# Patient Record
Sex: FEMALE | Race: BLACK OR AFRICAN AMERICAN | Employment: UNEMPLOYED | URBAN - METROPOLITAN AREA
[De-identification: names, ages, dates, MRNs, and addresses within clinical notes are randomized per-mention and may not be internally consistent; named-entity substitution may affect disease eponyms.]

---

## 2022-05-18 ENCOUNTER — TRANSCRIBE ORDER (OUTPATIENT)
Dept: SCHEDULING | Age: 62
End: 2022-05-18

## 2022-05-18 DIAGNOSIS — Z01.818 OTHER SPECIFIED PRE-OPERATIVE EXAMINATION: Primary | ICD-10-CM

## 2022-05-31 ENCOUNTER — HOSPITAL ENCOUNTER (OUTPATIENT)
Dept: CT IMAGING | Age: 62
Discharge: HOME | End: 2022-05-31
Attending: INTERNAL MEDICINE
Payer: MEDICAID

## 2022-05-31 DIAGNOSIS — Z01.818 OTHER SPECIFIED PRE-OPERATIVE EXAMINATION: ICD-10-CM

## 2022-05-31 LAB
BUN SERPL-MCNC: 21 MG/DL (ref 6–20)
CREAT SERPL-MCNC: 1.15 MG/DL (ref 0.55–1.02)

## 2022-05-31 PROCEDURE — 82565 ASSAY OF CREATININE: CPT

## 2022-05-31 PROCEDURE — 84520 ASSAY OF UREA NITROGEN: CPT

## 2022-05-31 PROCEDURE — 74011000636 HC RX REV CODE- 636

## 2022-05-31 PROCEDURE — 74011000250 HC RX REV CODE- 250

## 2022-05-31 PROCEDURE — 74177 CT ABD & PELVIS W/CONTRAST: CPT

## 2022-05-31 PROCEDURE — 36415 COLL VENOUS BLD VENIPUNCTURE: CPT

## 2022-05-31 RX ORDER — BARIUM SULFATE 20 MG/ML
450 SUSPENSION ORAL
Status: COMPLETED | OUTPATIENT
Start: 2022-05-31 | End: 2022-05-31

## 2022-05-31 RX ADMIN — IOPAMIDOL 100 ML: 612 INJECTION, SOLUTION INTRAVENOUS at 10:00

## 2022-05-31 RX ADMIN — BARIUM SULFATE 450 ML: 20 SUSPENSION ORAL at 08:30

## 2022-06-30 ENCOUNTER — HOSPITAL ENCOUNTER (EMERGENCY)
Age: 62
Discharge: HOME | End: 2022-06-30
Attending: FAMILY MEDICINE
Payer: MEDICAID

## 2022-06-30 ENCOUNTER — APPOINTMENT (OUTPATIENT)
Dept: CT IMAGING | Age: 62
End: 2022-06-30
Attending: FAMILY MEDICINE
Payer: MEDICAID

## 2022-06-30 ENCOUNTER — APPOINTMENT (OUTPATIENT)
Dept: GENERAL RADIOLOGY | Age: 62
End: 2022-06-30
Attending: FAMILY MEDICINE
Payer: MEDICAID

## 2022-06-30 VITALS
HEART RATE: 60 BPM | TEMPERATURE: 98.8 F | BODY MASS INDEX: 24.44 KG/M2 | RESPIRATION RATE: 16 BRPM | WEIGHT: 165 LBS | SYSTOLIC BLOOD PRESSURE: 118 MMHG | HEIGHT: 69 IN | OXYGEN SATURATION: 100 % | DIASTOLIC BLOOD PRESSURE: 65 MMHG

## 2022-06-30 DIAGNOSIS — M47.812 ARTHRITIS OF NECK: ICD-10-CM
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
## 2022-06-30 DIAGNOSIS — S00.83XA FACIAL CONTUSION, INITIAL ENCOUNTER: ICD-10-CM

## 2022-06-30 PROCEDURE — 72100 X-RAY EXAM L-S SPINE 2/3 VWS: CPT

## 2022-06-30 PROCEDURE — 70450 CT HEAD/BRAIN W/O DYE: CPT

## 2022-06-30 PROCEDURE — 99284 EMERGENCY DEPT VISIT MOD MDM: CPT

## 2022-06-30 PROCEDURE — 72125 CT NECK SPINE W/O DYE: CPT

## 2022-06-30 RX ORDER — MECLIZINE HYDROCHLORIDE 25 MG/1
25 TABLET ORAL
COMMUNITY

## 2022-06-30 RX ORDER — WARFARIN SODIUM 5 MG/1
5 TABLET ORAL DAILY
COMMUNITY

## 2022-06-30 RX ORDER — BUMETANIDE 0.5 MG/1
0.5 TABLET ORAL DAILY
COMMUNITY

## 2022-06-30 RX ORDER — PANTOPRAZOLE SODIUM 40 MG/1
40 TABLET, DELAYED RELEASE ORAL DAILY
COMMUNITY

## 2022-06-30 RX ORDER — METOPROLOL TARTRATE 25 MG/1
25 TABLET, FILM COATED ORAL 2 TIMES DAILY
COMMUNITY

## 2022-06-30 RX ORDER — EZETIMIBE 10 MG/1
10 TABLET ORAL DAILY
COMMUNITY

## 2022-06-30 RX ORDER — NITROGLYCERIN 0.3 MG/1
TABLET SUBLINGUAL
COMMUNITY

## 2022-06-30 RX ORDER — ESCITALOPRAM OXALATE 10 MG/1
10 TABLET ORAL DAILY
COMMUNITY

## 2022-06-30 RX ORDER — AMIODARONE HYDROCHLORIDE 200 MG/1
200 TABLET ORAL DAILY
COMMUNITY

## 2022-06-30 RX ORDER — ASPIRIN 81 MG/1
81 TABLET ORAL DAILY
COMMUNITY

## 2022-06-30 NOTE — ED NOTES
Checked on patient, patient resting comfortably with call bell and bed rails up. Will continue to monitor.

## 2022-06-30 NOTE — ED NOTES
Patient sleeping states her pain is minimal. Birth Control Pills Counseling: Birth Control Pill Counseling: I discussed with the patient the potential side effects of OCPs including but not limited to increased risk of stroke, heart attack, thrombophlebitis, deep venous thrombosis, hepatic adenomas, breast changes, GI upset, headaches, and depression.  The patient verbalized understanding of the proper use and possible adverse effects of OCPs. All of the patient's questions and concerns were addressed.

## 2022-06-30 NOTE — ED NOTES
[facility] here for patient report given to [facility] Par[facility]ic and Shira Olszewski EMT . Patient states she is dry and does not require a diaper change. Patient has her glasses and clothes and purse.

## 2022-06-30 NOTE — ED NOTES
Arranged transport to [place] via [facility] [place]).     ETA: 1000

## 2022-06-30 NOTE — ED TRIAGE NOTES
Pt arrived via EMS from [facility] with report of a fall with chin pain, right neck and right back pain. Nursing home paper states that CNA heard resident yelling and when CNA entered room pt was found on the floor sitting on her bottom. Pt told staff that she was trying to get out of bed and she got stuck between her bed and bed side table. Denies LOC. Pt received tylenol s/p fall at 0518. Pt arrived in C-collar and remains in Cervical collar.

## 2022-06-30 NOTE — ED PROVIDER NOTES
EMERGENCY DEPARTMENT HISTORY AND PHYSICAL EXAM          Date: 6/30/2022  Patient Name: Frank Marion    History of Presenting Illness     Chief Complaint   Patient presents with   Zina [place]field Fall    Neck Pain    Back Pain       History Provided By: Patient, EMS, Nursing home staff    HPI: Frank Marion is a 64 y.o. female, pmhx htn, dementia, frequent falls, who was brought  to the ED after she fell in her room, striking her chin on the bedside table. She denies LOC, but states that she began shouting for help soon after she fell. The CNA found her sitting on her bottom soon after that, and she helped her into her bed. EMS was called and they brought her to the ED with a cervical collar in place. Pt is able to move her arms normally and denies any numbness in her arms. She has some pain in the lateral right neck and also in her lower back, but more in the right lower lumbar region than in the spine. PCP: Ilda Lopez MD    Allergies: josselin  Lives at [facility]    There are no other complaints, changes, or physical findings at this time. Past History     Past Medical History:  Past Medical History:   Diagnosis Date    Aortic dissection (HCC)     Dementia ([facility] [address]     Falls frequently     High cholesterol     Hypertension        Past Surgical History:  History reviewed. No pertinent surgical history. Family History:  History reviewed. No pertinent family history. Social History:  Social History     Tobacco Use    Smoking status: Never Smoker    Smokeless tobacco: Never Used   Vaping Use    Vaping Use: Never used   Substance Use Topics    Alcohol use: Not Currently    Drug use: Never       Allergies:   Allergies   Allergen Reactions    Atorvastatin Unknown (comments)     \"all statins\"    Penicillins Rash    Percocet [Oxycodone-Acetaminophen] Itching         Review of Systems   Review of Systems   Unable to perform ROS: Dementia       Physical Exam   Physical Exam  Constitutional: Appearance: Normal appearance. HENT:      Head: Normocephalic. Right Ear: External ear normal.      Left Ear: External ear normal.      Nose: Nose normal.      Mouth/Throat:      Mouth: Mucous membranes are moist.   Eyes:      Pupils: Pupils are equal, round, and reactive to light. Neck:     Cardiovascular:      Rate and Rhythm: Normal rate and regular rhythm. Pulmonary:      Effort: Pulmonary effort is normal.      Breath sounds: Normal breath sounds. No wheezing or rhonchi. Abdominal:      General: Abdomen is flat. Palpations: Abdomen is soft. Tenderness: There is no abdominal tenderness. There is no guarding. Musculoskeletal:         General: No swelling, tenderness or signs of injury. Normal range of motion. Cervical back: No rigidity. Muscular tenderness present. Skin:     General: Skin is warm and dry. Neurological:      General: No focal deficit present. Mental Status: She is alert. Motor: No weakness. Comments: , biceps, triceps 5/5 bilaterally. Hip flexors, DF, PF 5/5 bilaterally. Sensation in tact to LT throughout, symmetric. Diagnostic Study Results     Labs -   No results found for this or any previous visit (from the past 12 hour(s)). Radiologic Studies -   XR SPINE LUMB 2 OR 3 V   Final Result   Mild DJD, no fracture or acute findings seen. CT HEAD WO CONT   Final Result   No acute findings seen. CT SPINE CERV WO CONT   Final Result   No acute findings. CT Results  (Last 48 hours)               06/30/22 0711  CT HEAD WO CONT Final result    Impression:  No acute findings seen. Narrative:  EXAM: CT HEAD WO CONT       INDICATION: fall, struck head on table       COMPARISON: None. CONTRAST: None. TECHNIQUE: Unenhanced CT of the head was performed using 5 mm images. Brain and   bone windows were generated. Coronal and sagittal reformats.  CT dose reduction   was achieved through use of a standardized protocol tailored for this   examination and automatic exposure control for dose modulation. FINDINGS:   There is no extra-axial fluid collection hemorrhage shift or masses. 06/30/22 0711  CT SPINE CERV WO CONT Final result    Impression:  No acute findings. Narrative:  EXAM:  CT CERVICAL SPINE WITHOUT CONTRAST       INDICATION: fall, struck head on table. COMPARISON: None. CONTRAST:  None. TECHNIQUE: Multislice helical CT of the cervical spine was performed without   intravenous contrast administration. Sagittal and coronal reformats were   generated. CT dose reduction was achieved through use of a standardized   protocol tailored for this examination and automatic exposure control for dose   modulation. FINDINGS:   Some reverse of the normal cervical lordosis. Calcification of the posterior   longitudinal ligament, disc space narrowing and anterior spurring. Mild   foraminal narrowing from C3 to C6 bilaterally. 3 C4 shows some mild uncovertebral joint degeneration hypertrophy resulting in   the mild foraminal stenosis. There is some central disc bulging. C4-C5 show mild bilateral foraminal narrowing. There is some central spurring   with mild canal narrowing. C5-C6 shows significant left foraminal narrowing by uncovertebral joint   hypertrophy, mild canal stenosis. C6-C7 shows no focal findings. No acute fracture or dislocation seen. CXR Results  (Last 48 hours)    None            Medical Decision Making   I am the first provider for this patient. I reviewed the vital signs, available nursing notes, past medical history, past surgical history, family history and social history. Vital Signs-Reviewed the patient's vital signs.   Patient Vitals for the past 12 hrs:   Temp Pulse Resp BP SpO2   06/30/22 0631 98.8 °F (37.1 °C) 60 17 113/67 98 %       Pulse Oximetry Analysis - 98% on RA    Records Reviewed: Nursing Notes and Old Medical Records    Provider Notes (Medical Decision Making):   MDM: Pt with dementia from nursing home with fall, striking head on table. Landed sitting down on the floor, which puts an axial load on the spine. Will CT head, cervical spine, and because she has recently had CT of chest, abdomen and pelvis, will do plain films of lumbar spine. ED Course:   Initial assessment performed. The patients presenting problems have been discussed, and they are in agreement with the care plan formulated and outlined with them. I have encouraged them to ask questions as they arise throughout their visit. PROGRESS NOTE:   Pt received 1000 mg acetaminophen at her nursing home at 0518 and appears fairly comfortable so will not give anything else for pain now. Patient signed out to Dr. Denia Sanchez at [facility].      8:00 AM   CT images and x-rays reviewed. Cspine with arthritis but without acute injury. C-cl=ollar removed and pt doing well. At this time she has no complaints of pain, weakness or numbness. She is appropriate for discharge. Discharge note:  Pt re-evaluated and noted to be feeling better, ready for discharge. Updated pt on all final imaging findings. Will follow up as instructed. All questions have been answered, pt voiced understanding and agreement with plan. Specific return precautions provided as well as instructions to return to the ED should sx worsen at any time. Vital signs stable for discharge. Critical Care Time:   0      Diagnosis     Clinical Impression:   1. Fall, initial encounter    2. Facial contusion, initial encounter    3. Arthritis of neck        PLAN:  1. There are no discharge medications for this patient.     2.   Follow-up Information     Follow up With Specialties Details Why Becka Keene MD Internal Medicine Physician Schedule an appointment as soon as possible for a visit   [address] [place] [address] EMERGENCY DEP Emergency Medicine  If symptoms worsen [address] [facility] [address]        Return to ED if worse     Disposition:  Home       Please note, this dictation was completed with [employer], the computer voice recognition software. Quite often unanticipated grammatical, syntax, homophones, and other interpretive errors are inadvertently transcribed by the computer software. Please disregard these errors. Please excuse any errors that have escaped final proof reading.

## 2022-06-30 NOTE — ED NOTES
I have reviewed discharge instructions with the patient and caregiver. The patient and caregiver verbalized understanding. Discharge medications discussed with patient. No questions at this time. To [place] with [facility] crew.